# Patient Record
(demographics unavailable — no encounter records)

---

## 2025-01-12 NOTE — CONSULT LETTER
[Dear  ___] : Dear ~JAKE, [Courtesy Letter:] : I had the pleasure of seeing your patient, [unfilled], in my office today. [Please see my note below.] : Please see my note below. [Consult Closing:] : Thank you very much for allowing me to participate in the care of this patient.  If you have any questions, please do not hesitate to contact me. [Sincerely,] : Sincerely, [FreeTextEntry2] : Altagracia Church MD [address] [place] [address]  Justin Pimentel MD [address] [facility], [place] [address] [FreeTextEntry3] : Jaclyn Burns MD, [facility] As certified below, I, or a nurse practitioner or physician assistant working with me, had a face-to-face encounter that meets the physician face-to-face encounter requirements.

## 2025-01-12 NOTE — HISTORY OF PRESENT ILLNESS
[TextBox_4] : Initially cared for by me in 2018 for asthma worsening postpartum (Estefany). She was treated with Pulmicort and not seen after 2019. Saw Dr Rangel now more recently in 3/2024 for symptoms on albuterol prn and was switched to Symbicort prn.  6/28/2024: She was not able to fill the Symbicort so used some old Pulmicort temporarily. No longer using Pulmicort as asymptomatic. Using rescue not at all right now. Her symptoms are very intermittent and when she needs the inhaler, she may need it several days in a row several times a day.  No asthma exacerbations past year requiring UCC/ED/steroids. Had Covid over a year ago and a fainting spell (vs seizure activity) after that, was seen in a long Covid clinic.  9/24/2024: [GP is Dr Church, gyn is Dr Justin Pimentel.] Returns w acute dyspnea. Got an [facility] ring. First night she wore (Sunday) it she woke up dyspneic - felt like no air was moving - and took Symbicort and Benadryl and went back to sleep. The next day she noted that the [facility] reported no heart activity at that time. Then yesterday went to the opera and mid-opera had "severe asthma" and there was a dog a few rows back. She is allergic to dogs. Air was moving but felt like going through a straw. Went outside, didn't have her inhaler, gradually felt somewhat better. Actually feels dyspneic right now. Since seeing me last she's needed inhalers maybe 2x a week, not daily, and she uses both Symbicort and albuterol. She's not sick, kids were sick but they have recovered, age 10 Sam and 6 Estefany. No throat, pain no fever. Doesn't generally snore or have apneas but  reported snoring last night. Newly on lamictal for mood stabilizer, other meds the same, stopped drinking for the most part, no unusual meds Sunday night, did have an edible yesterday before the opera.  1/10/2025: Sick since Friday (one week) with fever, myalgias, night sweats, extreme fatigue, swollen nodes. Bedridden over weekend. UCC maybe 2d ago, CXR clear, given medrol dose pack and on day 2 now. Not given Abx. Using Symbicort. Still green stuff in nose. Really soaking night sweats. Shakiness, no appetite, weight loss. Discolored sputum is abating.

## 2025-01-12 NOTE — ASSESSMENT
[FreeTextEntry1] : Data reviewed:  Spirometry 11/1/18: normal / FENO 82 [place] 5/15/19: normal [place] 9/26/19: normal / FENO 85 FENO 3/2024: 84 [place] 9/24/2024: normal / FEV1 98  Impression: Acute viral URI Asthma - clinical dx based on hx and FENO  Plan: RVP. Continue w medrol dose pack and prn Symbicort. I do not think she needs Abx at this time. She is interested in clarifying if she has asthma, as am I. She at least has evidence of eosinophilic bronchitis. When she is well and not using Symbicort, will bring her back for MCT and follow up. -- Influenza A, spoke to her 1/10/2025 - nothing to do for her - she is vaxxed but family is not. She is out of window for Tamiflu but  has just become symptomatic w cough and fever, he can speak to his doc about Tamiflu.

## 2025-01-12 NOTE — PHYSICAL EXAM
[No Acute Distress] : no acute distress [Normal Rate/Rhythm] : normal rate/rhythm [Normal S1, S2] : normal s1, s2 [TextBox_2] : nontoxic [TextBox_68] : rhonchi - no rales or wheeze

## 2025-01-12 NOTE — HISTORY OF PRESENT ILLNESS
H&P reviewed. The patient was examined and there are no changes to the H&P.   [TextBox_4] : Initially cared for by me in 2018 for asthma worsening postpartum (Estefany). She was treated with Pulmicort and not seen after 2019. Saw Dr Rangel now more recently in 3/2024 for symptoms on albuterol prn and was switched to Symbicort prn.  6/28/2024: She was not able to fill the Symbicort so used some old Pulmicort temporarily. No longer using Pulmicort as asymptomatic. Using rescue not at all right now. Her symptoms are very intermittent and when she needs the inhaler, she may need it several days in a row several times a day.  No asthma exacerbations past year requiring UCC/ED/steroids. Had Covid over a year ago and a fainting spell (vs seizure activity) after that, was seen in a long Covid clinic.  9/24/2024: [GP is Dr Church, gyn is Dr Justin Pimentel.] Returns w acute dyspnea. Got an [facility] ring. First night she wore (Sunday) it she woke up dyspneic - felt like no air was moving - and took Symbicort and Benadryl and went back to sleep. The next day she noted that the [facility] reported no heart activity at that time. Then yesterday went to the opera and mid-opera had "severe asthma" and there was a dog a few rows back. She is allergic to dogs. Air was moving but felt like going through a straw. Went outside, didn't have her inhaler, gradually felt somewhat better. Actually feels dyspneic right now. Since seeing me last she's needed inhalers maybe 2x a week, not daily, and she uses both Symbicort and albuterol. She's not sick, kids were sick but they have recovered, age 10 Sam and 6 Estefany. No throat, pain no fever. Doesn't generally snore or have apneas but  reported snoring last night. Newly on lamictal for mood stabilizer, other meds the same, stopped drinking for the most part, no unusual meds Sunday night, did have an edible yesterday before the opera.  1/10/2025: Sick since Friday (one week) with fever, myalgias, night sweats, extreme fatigue, swollen nodes. Bedridden over weekend. UCC maybe 2d ago, CXR clear, given medrol dose pack and on day 2 now. Not given Abx. Using Symbicort. Still green stuff in nose. Really soaking night sweats. Shakiness, no appetite, weight loss. Discolored sputum is abating.

## 2025-01-12 NOTE — CONSULT LETTER
[Dear  ___] : Dear ~JAKE, [Courtesy Letter:] : I had the pleasure of seeing your patient, [unfilled], in my office today. [Please see my note below.] : Please see my note below. [Consult Closing:] : Thank you very much for allowing me to participate in the care of this patient.  If you have any questions, please do not hesitate to contact me. [Sincerely,] : Sincerely, [FreeTextEntry2] : Altagracia Church MD [address] [place] [address]  Justin Pimentel MD [address] [facility], [place] [address] [FreeTextEntry3] : Jaclyn Burns MD, [facility]

## 2025-01-12 NOTE — ASSESSMENT
[FreeTextEntry1] : Data reviewed:  Spirometry 11/1/18: normal / FENO 82 [place] 5/15/19: normal [place] 9/26/19: normal / FENO 85 FENO 3/2024: 84 [place] 9/24/2024: normal / FEV1 98  Impression: Acute viral URI Asthma - clinical dx based on hx and FENO  Plan: RVP. Continue w medrol dose pack and prn Symbicort. I do not think she needs Abx at this time. She is interested in clarifying if she has asthma, as am I. She at least has evidence of eosinophilic bronchitis. When she is well and not using Symbicort, will bring her back for MCT and follow up. -- Influenza A, spoke to her 1/10/2025 - nothing to do for her - she is vaxxed but family is not. She is out of window for Tamiflu but  has just become symptomatic w cough and fever, he can speak to his doc about Tamiflu.

## 2025-02-12 NOTE — CONSULT LETTER
[Dear  ___] : Dear ~JAKE, [Courtesy Letter:] : I had the pleasure of seeing your patient, [unfilled], in my office today. [Please see my note below.] : Please see my note below. [Consult Closing:] : Thank you very much for allowing me to participate in the care of this patient.  If you have any questions, please do not hesitate to contact me. [Sincerely,] : Sincerely, [FreeTextEntry2] : Altagracia Church MD [address] [place] [address]  Justin Pimentel MD [address] [facility], [place] [address] [FreeTextEntry3] : Jaclyn Burns MD, [facility]

## 2025-02-12 NOTE — HISTORY OF PRESENT ILLNESS
[TextBox_4] : Initially cared for by me in 2018 for asthma worsening postpartum (Estefany). She was treated with Pulmicort and not seen after 2019. Saw Dr Rangel now more recently in 3/2024 for symptoms on albuterol prn and was switched to Symbicort prn.  6/28/2024: She was not able to fill the Symbicort so used some old Pulmicort temporarily. No longer using Pulmicort as asymptomatic. Using rescue not at all right now. Her symptoms are very intermittent and when she needs the inhaler, she may need it several days in a row several times a day.  No asthma exacerbations past year requiring UCC/ED/steroids. Had Covid over a year ago and a fainting spell (vs seizure activity) after that, was seen in a long Covid clinic.  9/24/2024: [GP is Dr Church, gyn is Dr Justin Pimentel.] Returns w acute dyspnea. Got an [facility] ring. First night she wore (Sunday) it she woke up dyspneic - felt like no air was moving - and took Symbicort and Benadryl and went back to sleep. The next day she noted that the [facility] reported no heart activity at that time. Then yesterday went to the opera and mid-opera had "severe asthma" and there was a dog a few rows back. She is allergic to dogs. Air was moving but felt like going through a straw. Went outside, didn't have her inhaler, gradually felt somewhat better. Actually feels dyspneic right now. Since seeing me last she's needed inhalers maybe 2x a week, not daily, and she uses both Symbicort and albuterol. She's not sick, kids were sick but they have recovered, age 10 Sam and 6 Estefany. No throat, pain no fever. Doesn't generally snore or have apneas but  reported snoring last night. Newly on lamictal for mood stabilizer, other meds the same, stopped drinking for the most part, no unusual meds Sunday night, did have an edible yesterday before the opera.  1/10/2025: Sick since Friday (one week) with fever, myalgias, night sweats, extreme fatigue, swollen nodes. Bedridden over weekend. UCC maybe 2d ago, CXR clear, given medrol dose pack and on day 2 now. Not given Abx. Using Symbicort. Still green stuff in nose. Really soaking night sweats. Shakiness, no appetite, weight loss. Discolored sputum is abating.  2/12/2025: Returns for MCT and review. After recovering from flu, during which time she needed Symbicort approx qid, she has been well needing Symbicort 1-2 x per week.